# Patient Record
Sex: MALE
[De-identification: names, ages, dates, MRNs, and addresses within clinical notes are randomized per-mention and may not be internally consistent; named-entity substitution may affect disease eponyms.]

---
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
## 2019-08-23 NOTE — IP AVS SNAPSHOT
[facility]    [address], [place], [place] ~ [phone]                Discharge Summary   5/24/2017    Bernard Wright           Admission Information        Provider Department    5/24/2017 Sebastian Gilmore MD [facility] [Clinical] : TNM Stage: c [TTNM] : 3 hours. You can gargle with warm salt water (1/2 tsp in 4 oz warm water) or use a throat lozenge for comfort  · To feel muscle aches or soreness especially in the abdomen, chest or neck.  The achy feeling should go away in the next 24 hours  · To feel weak, [address]  [phone]      Discharge References/Attachments     DISCHARGE INSTRUCTIONS: AFTER YOUR SURGERY (ENGLISH)      Instructions and Information about Your Health     None      Follow-up Information [MTNM] : 0 [employer] will allow you to access patient instructions from your recent visit,  view other health information, and more. To sign up or find more information, go to [email or web address] and click on the Sign Up Now link in the [employer] box.      Enter [NTNM] : x [N/A] : Currently not applicable
